# Patient Record
Sex: MALE | Race: WHITE | NOT HISPANIC OR LATINO | URBAN - NONMETROPOLITAN AREA
[De-identification: names, ages, dates, MRNs, and addresses within clinical notes are randomized per-mention and may not be internally consistent; named-entity substitution may affect disease eponyms.]

---

## 2020-02-24 ENCOUNTER — IMPORTED ENCOUNTER (OUTPATIENT)
Dept: URBAN - NONMETROPOLITAN AREA CLINIC 1 | Facility: CLINIC | Age: 60
End: 2020-02-24

## 2020-02-24 PROBLEM — H52.4: Noted: 2020-02-24

## 2020-02-24 PROBLEM — H35.3131: Noted: 2020-02-24

## 2020-02-24 PROBLEM — H25.13: Noted: 2020-02-24

## 2020-02-24 PROCEDURE — S0620 ROUTINE OPHTHALMOLOGICAL EXA: HCPCS

## 2020-02-24 NOTE — PATIENT DISCUSSION
Astigmatism / Hyperopia / Presbyopia OU - Discussed diagnosis in detail with patient- New glasses RX given today- Continue to monitor [place] OU- Discussed diagnosis in detail with patient- Discussed signs and symptoms of progression- Discussed UV protection- No treatment needed at this time - Continue to monitorARMD OU- Discussed diagnosis in detail with patient- Strong family history of ARMD - Recommend  patient start eye vitamins daily such as Preservision. Sample given 2/24/20- Recommend that patient start following the [address] patient to call or come into the office ASAP if any changes noted from today.  Grid given 2/24/20 with instructions on how to use - Continue to monitor- RTC 6 months with OCT

## 2022-04-15 ASSESSMENT — VISUAL ACUITY
OD_SC: 20/20-
OS_SC: 20/20-

## 2022-04-15 ASSESSMENT — TONOMETRY
OD_IOP_MMHG: 18
OS_IOP_MMHG: 17